# Patient Record
Sex: FEMALE | Race: WHITE | URBAN - METROPOLITAN AREA
[De-identification: names, ages, dates, MRNs, and addresses within clinical notes are randomized per-mention and may not be internally consistent; named-entity substitution may affect disease eponyms.]

---

## 2018-09-26 ENCOUNTER — OFFICE VISIT (OUTPATIENT)
Dept: URGENT CARE | Facility: URGENT CARE | Age: 58
End: 2018-09-26
Payer: COMMERCIAL

## 2018-09-26 VITALS
DIASTOLIC BLOOD PRESSURE: 80 MMHG | SYSTOLIC BLOOD PRESSURE: 128 MMHG | OXYGEN SATURATION: 95 % | TEMPERATURE: 98.1 F | RESPIRATION RATE: 18 BRPM | BODY MASS INDEX: 36.22 KG/M2 | HEART RATE: 90 BPM | WEIGHT: 221 LBS

## 2018-09-26 DIAGNOSIS — N23 RENAL COLIC: ICD-10-CM

## 2018-09-26 DIAGNOSIS — R30.0 DYSURIA: Primary | ICD-10-CM

## 2018-09-26 LAB
ALBUMIN UR-MCNC: NEGATIVE MG/DL
APPEARANCE UR: CLEAR
BACTERIA #/AREA URNS HPF: ABNORMAL /HPF
BILIRUB UR QL STRIP: NEGATIVE
COLOR UR AUTO: YELLOW
GLUCOSE UR STRIP-MCNC: NEGATIVE MG/DL
HGB UR QL STRIP: ABNORMAL
KETONES UR STRIP-MCNC: NEGATIVE MG/DL
LEUKOCYTE ESTERASE UR QL STRIP: ABNORMAL
MUCOUS THREADS #/AREA URNS LPF: PRESENT /LPF
NITRATE UR QL: NEGATIVE
NON-SQ EPI CELLS #/AREA URNS LPF: ABNORMAL /LPF
PH UR STRIP: 6 PH (ref 5–7)
RBC #/AREA URNS AUTO: ABNORMAL /HPF
SOURCE: ABNORMAL
SP GR UR STRIP: 1.01 (ref 1–1.03)
UROBILINOGEN UR STRIP-ACNC: 0.2 EU/DL (ref 0.2–1)
WBC #/AREA URNS AUTO: ABNORMAL /HPF

## 2018-09-26 PROCEDURE — 81001 URINALYSIS AUTO W/SCOPE: CPT | Performed by: NURSE PRACTITIONER

## 2018-09-26 PROCEDURE — 99213 OFFICE O/P EST LOW 20 MIN: CPT | Performed by: NURSE PRACTITIONER

## 2018-09-26 RX ORDER — SOTALOL HYDROCHLORIDE 80 MG/1
TABLET ORAL
COMMUNITY

## 2018-09-26 RX ORDER — SULFAMETHOXAZOLE/TRIMETHOPRIM 800-160 MG
1 TABLET ORAL 2 TIMES DAILY
Qty: 6 TABLET | Refills: 0 | Status: SHIPPED | OUTPATIENT
Start: 2018-09-26 | End: 2018-09-29

## 2018-09-26 ASSESSMENT — ENCOUNTER SYMPTOMS
FREQUENCY: 1
FEVER: 0
SORE THROAT: 0
HEADACHES: 0
SHORTNESS OF BREATH: 0
VOMITING: 0
HEMATURIA: 1
CHILLS: 0
RHINORRHEA: 0
COUGH: 0
NAUSEA: 0
DIARRHEA: 0

## 2018-09-26 ASSESSMENT — PAIN SCALES - GENERAL: PAINLEVEL: MODERATE PAIN (5)

## 2018-09-26 NOTE — MR AVS SNAPSHOT
"              After Visit Summary   9/26/2018    Leonora Quiros    MRN: [id number]           Patient Information     Date Of Birth          1960        Visit Information        Provider Department      9/26/2018 12:00 PM Linda Weldon NP [facility]        Today's Diagnoses     Dysuria    -  1    Renal colic          Care Instructions       * KIDNEY STONE (w/ Colic)    The sharp cramping pain and nausea/vomiting that you have is due to a small stone which has formed in the kidney and is now passing down a narrow tube (ureter) on its way to your bladder. Once it reaches your bladder, the pain will stop. The stone may pass in your urine stream in one piece. [The size may be 1/16\" to 1/4\" (1-6mm)]. Or, the stone may also break up into laney fragments which you may not even notice.  Once you have had a kidney stone there is a risk for recurrence in the future.  HOME CARE:      Drink lots of fluid (at least 8-10 glasses of water a day).    Most stones will pass on their own, but may take from a few hours to a few days.    Each time you urinate, do so in a jar. Pour the urine from the jar through the strainer and into the toilet. Continue doing this until 24 hours after your pain stops. By then, if there was a kidney stone, it should pass from your bladder. Some stones dissolve into sand-like particles and pass right through the strainer. In that case, you won't ever see a stone.    Save any stone that you find in the strainer and bring it to your doctor for analysis. It may be possible to prevent certain types of stones from forming. Therefore, it is important to know what kind of stone you have.    Try to stay as active as possible since this will help the stone pass. Do not stay in bed unless your pain prevents you from getting up. You may notice a red, pink or brown color to your urine. This is normal while passing a kidney stone.  FOLLOW UP with your doctor or return to this facility if the pain " lasts more than 48 hours.  GET PROMPT MEDICAL ATTENTION if any of the following occur:    Pain that is not controlled by the medicine given    Repeated vomiting or unable to keep down fluids    Weakness, dizziness or fainting    Fever over 101  F (38.3  C)    Passage of solid red or brown urine (can't see through it) or urine with lots of blood clots    Unable to pass urine for 8 hours and increasing bladder pressure    8972-7600 The [employer]. [address] [place] [address], [place] [address]. All rights reserved. This information is not intended as a substitute for professional medical care. Always follow your healthcare professional's instructions.  This information has been modified by your health care provider with permission from the publisher.            Follow-ups after your visit        Who to contact     If you have questions or need follow up information about today's clinic visit or your schedule please contact [facility] directly at [phone].  Normal or non-critical lab and imaging results will be communicated to you by MyChart, letter or phone within 4 business days after the clinic has received the results. If you do not hear from us within 7 days, please contact the clinic through MyChart or phone. If you have a critical or abnormal lab result, we will notify you by phone as soon as possible.  Submit refill requests through [employer] or call your pharmacy and they will forward the refill request to us. Please allow 3 business days for your refill to be completed.          Additional Information About Your Visit        Care EveryWhere ID     This is your Care EveryWhere ID. This could be used by other organizations to access your [facility] medical records  [id number]        Your Vitals Were     Pulse Temperature Respirations Pulse Oximetry BMI (Body Mass Index)       90 98.1  F (36.7  C) (Oral) 18 95% 36.22 kg/m2        Blood Pressure from Last 3 Encounters:   09/26/18  128/80   10/10/10 138/87   09/25/10 129/90    Weight from Last 3 Encounters:   09/26/18 221 lb (100.2 kg)   10/10/10 207 lb 12.8 oz (94.3 kg)   09/25/10 210 lb 3.2 oz (95.3 kg)              We Performed the Following     *UA reflex to Microscopic and Culture ([place] and [facility] (except [place] and [place])     Urine Microscopic          Today's Medication Changes          These changes are accurate as of 9/26/18 12:47 PM.  If you have any questions, ask your nurse or doctor.               Start taking these medicines.        Dose/Directions    acetaminophen-codeine 300-30 MG per tablet   Commonly known as:  TYLENOL #3   Used for:  Renal colic   Started by:  Linda Weldon NP        Dose:  1 tablet   Take 1 tablet by mouth every 6 hours as needed for moderate to severe pain   Quantity:  16 tablet   Refills:  0       sulfamethoxazole-trimethoprim 800-160 MG per tablet   Commonly known as:  BACTRIM DS/SEPTRA DS   Used for:  Dysuria   Started by:  Linda Weldon NP        Dose:  1 tablet   Take 1 tablet by mouth 2 times daily for 3 days   Quantity:  6 tablet   Refills:  0            Where to get your medicines      These medications were sent to [facility] [address] [place] [address] [place] [address] [place] AT [facility] [address]  [address] [place] [address]     Phone:  [phone]     sulfamethoxazole-trimethoprim 800-160 MG per tablet         Some of these will need a paper prescription and others can be bought over the counter.  Ask your nurse if you have questions.     Bring a paper prescription for each of these medications     acetaminophen-codeine 300-30 MG per tablet               Information about OPIOIDS     PRESCRIPTION OPIOIDS: WHAT YOU NEED TO KNOW   We gave you an opioid (narcotic) pain medicine. It is important to manage your pain, but opioids are not always the best choice. You should first try all the other options your care team gave you. Take this medicine for as short  a time (and as few doses) as possible.    Some activities can increase your pain, such as bandage changes or therapy sessions. It may help to take your pain medicine 30 to 60 minutes before these activities. Reduce your stress by getting enough sleep, working on hobbies you enjoy and practicing relaxation or meditation. Talk to your care team about ways to manage your pain beyond prescription opioids.    These medicines have risks:    DO NOT drive when on new or higher doses of pain medicine. These medicines can affect your alertness and reaction times, and you could be arrested for driving under the influence (DUI). If you need to use opioids long-term, talk to your care team about driving.    DO NOT operate heavy machinery    DO NOT do any other dangerous activities while taking these medicines.    DO NOT drink any alcohol while taking these medicines.     If the opioid prescribed includes acetaminophen, DO NOT take with any other medicines that contain acetaminophen. Read all labels carefully. Look for the word  acetaminophen  or  Tylenol.  Ask your pharmacist if you have questions or are unsure.    You can get addicted to pain medicines, especially if you have a history of addiction (chemical, alcohol or substance dependence). Talk to your care team about ways to reduce this risk.    All opioids tend to cause constipation. Drink plenty of water and eat foods that have a lot of fiber, such as fruits, vegetables, prune juice, apple juice and high-fiber cereal. Take a laxative (Miralax, milk of magnesia, Colace, Senna) if you don t move your bowels at least every other day. Other side effects include upset stomach, sleepiness, dizziness, throwing up, tolerance (needing more of the medicine to have the same effect), physical dependence and slowed breathing.    Store your pills in a secure place, locked if possible. We will not replace any lost or stolen medicine. If you don t finish your medicine, please throw away  (dispose) as directed by your pharmacist. The [place] Pollution Control Agency has more information about safe disposal: [email or web address].pca.[email or web address].[place].[email or web address]/living-green/managing-[email or web address]-medications         Primary Care Provider Fax #    Physician No Ref-Primary [phone]       No address on file        Equal Access to Services     OCHOAVERONICA AMADOR : Antonio mehdi ku hadasho Soomaali, waaxda luqadaha, qaybta kaalmada adealli, katalina tinoin hayaagaldino mcclelland maximino bárbara ceron. So [facility] [phone].    ATENCIÓN: Si habla español, tiene a reyes disposición servicios gratuitos de asistencia lingüística. Andrew al [phone].    We comply with applicable federal civil rights laws and [place] laws. We do not discriminate on the basis of race, color, national origin, age, disability, sex, sexual orientation, or gender identity.            Thank you!     Thank you for choosing [facility]  for your care. Our goal is always to provide you with excellent care. Hearing back from our patients is one way we can continue to improve our services. Please take a few minutes to complete the written survey that you may receive in the mail after your visit with us. Thank you!             Your Updated Medication List - Protect others around you: Learn how to safely use, store and throw away your medicines at www.disposemymeds.org.          This list is accurate as of 9/26/18 12:47 PM.  Always use your most recent med list.                   Brand Name Dispense Instructions for use Diagnosis    acetaminophen-codeine 300-30 MG per tablet    TYLENOL #3    16 tablet    Take 1 tablet by mouth every 6 hours as needed for moderate to severe pain    Renal colic       alendronate 70 MG tablet    FOSAMAX     take 70 mg by mouth every 7 days.    Skin tag       * aspirin 325 MG EC tablet      take 325 mg by mouth daily. (*)    Skin tag       * ASPIRIN 81 PO           calcium 600 MG tablet      take 1 tablet by mouth 2 times daily.    Skin tag        levothyroxine 75 MCG tablet    SYNTHROID/LEVOTHROID     Take 100 mcg by mouth daily    Skin tag       LIPITOR 40 MG tablet   Generic drug:  atorvastatin      take 40 mg by mouth daily.    Skin tag       lisinopril 5 MG tablet    PRINIVIL/ZESTRIL     take 5 mg by mouth 2 times daily.    Skin tag       metoprolol succinate 50 MG 24 hr tablet    TOPROL-XL     Take 100 mg by mouth daily    Skin tag       MULTI-VITAMIN PO      take  by mouth.    Skin tag       rivaroxaban ANTICOAGULANT 20 MG Tabs tablet    XARELTO     Take 20 mg by mouth daily (with dinner)        sotalol HCl (AF) 80 MG Tabs           sulfamethoxazole-trimethoprim 800-160 MG per tablet    BACTRIM DS/SEPTRA DS    6 tablet    Take 1 tablet by mouth 2 times daily for 3 days    Dysuria       * Notice:  This list has 2 medication(s) that are the same as other medications prescribed for you. Read the directions carefully, and ask your doctor or other care provider to review them with you.

## 2018-09-26 NOTE — PATIENT INSTRUCTIONS
"   * KIDNEY STONE (w/ Colic)    The sharp cramping pain and nausea/vomiting that you have is due to a small stone which has formed in the kidney and is now passing down a narrow tube (ureter) on its way to your bladder. Once it reaches your bladder, the pain will stop. The stone may pass in your urine stream in one piece. [The size may be 1/16\" to 1/4\" (1-6mm)]. Or, the stone may also break up into laney fragments which you may not even notice.  Once you have had a kidney stone there is a risk for recurrence in the future.  HOME CARE:      Drink lots of fluid (at least 8-10 glasses of water a day).    Most stones will pass on their own, but may take from a few hours to a few days.    Each time you urinate, do so in a jar. Pour the urine from the jar through the strainer and into the toilet. Continue doing this until 24 hours after your pain stops. By then, if there was a kidney stone, it should pass from your bladder. Some stones dissolve into sand-like particles and pass right through the strainer. In that case, you won't ever see a stone.    Save any stone that you find in the strainer and bring it to your doctor for analysis. It may be possible to prevent certain types of stones from forming. Therefore, it is important to know what kind of stone you have.    Try to stay as active as possible since this will help the stone pass. Do not stay in bed unless your pain prevents you from getting up. You may notice a red, pink or brown color to your urine. This is normal while passing a kidney stone.  FOLLOW UP with your doctor or return to this facility if the pain lasts more than 48 hours.  GET PROMPT MEDICAL ATTENTION if any of the following occur:    Pain that is not controlled by the medicine given    Repeated vomiting or unable to keep down fluids    Weakness, dizziness or fainting    Fever over 101  F (38.3  C)    Passage of solid red or brown urine (can't see through it) or urine with lots of blood " clots    Unable to pass urine for 8 hours and increasing bladder pressure    8528-0627 [employer]. [address] [place] [address], [place] [address]. All rights reserved. This information is not intended as a substitute for professional medical care. Always follow your healthcare professional's instructions.  This information has been modified by your health care provider with permission from the publisher.

## 2018-09-26 NOTE — PROGRESS NOTES
SUBJECTIVE:   Leonora Quiros is a 58 year old female presenting with a chief complaint of   Chief Complaint   Patient presents with     UTI     x1 week       She is an established patient of [facility].    UTI    Onset of symptoms was 1week(s).  Course of illness is worsening  Severity moderate  Current and associated symptoms frequency, blood in urine and suprapubic pain and pressure  Treatment and measures tried None  Predisposing factors include history of Pyelonephritis  Patient denies rigors, flank pain, temperature > 101 degrees F., vomiting, vaginal discharge, vaginal odor and vaginal itching          Review of Systems   Constitutional: Negative for chills and fever.   HENT: Negative for congestion, ear pain, rhinorrhea and sore throat.    Respiratory: Negative for cough and shortness of breath.    Gastrointestinal: Negative for diarrhea, nausea and vomiting.   Genitourinary: Positive for frequency and hematuria.        Suprapubic pain and pressure   Neurological: Negative for headaches.   All other systems reviewed and are negative.      Past Medical History:   Diagnosis Date     Hypertension      Family History   Problem Relation Age of Onset     Cancer Mother      HEART DISEASE Mother      Hypertension Mother      Cerebrovascular Disease Mother      HEART DISEASE Father      Diabetes No family hx of      Thyroid Disease No family hx of      Glaucoma No family hx of      Macular Degeneration No family hx of      Current Outpatient Prescriptions   Medication Sig Dispense Refill     acetaminophen-codeine (TYLENOL #3) 300-30 MG per tablet Take 1 tablet by mouth every 6 hours as needed for moderate to severe pain 16 tablet 0     ASPIRIN 81 PO        atorvastatin (LIPITOR) 40 MG tablet take 40 mg by mouth daily.       Calcium 600 MG tablet take 1 tablet by mouth 2 times daily.       levothyroxine (SYNTHROID, LEVOTHROID) 75 MCG tablet Take 100 mcg by mouth daily        lisinopril (PRINIVIL,ZESTRIL) 5 MG tablet take  5 mg by mouth 2 times daily.       metoprolol (TOPROL-XL) 50 MG 24 hr tablet Take 100 mg by mouth daily        Multiple Vitamin (MULTI-VITAMIN PO) take  by mouth.       rivaroxaban ANTICOAGULANT (XARELTO) 20 MG TABS tablet Take 20 mg by mouth daily (with dinner)       sotalol HCl, AF, 80 MG TABS        sulfamethoxazole-trimethoprim (BACTRIM DS/SEPTRA DS) 800-160 MG per tablet Take 1 tablet by mouth 2 times daily for 3 days 6 tablet 0     ALENdronate (FOSAMAX) 70 MG tablet take 70 mg by mouth every 7 days.       aspirin 325 MG EC tablet take 325 mg by mouth daily. (*)       Social History   Substance Use Topics     Smoking status: Former Smoker     Quit date: 3/19/2007     Smokeless tobacco: Never Used     Alcohol use No       OBJECTIVE  /80 (BP Location: Left arm, Patient Position: Chair, Cuff Size: Adult Large)  Pulse 90  Temp 98.1  F (36.7  C) (Oral)  Resp 18  Wt 221 lb (100.2 kg)  SpO2 95%  BMI 36.22 kg/m2    Physical Exam   Cardiovascular: Normal rate and normal heart sounds.    Pulmonary/Chest: Effort normal and breath sounds normal.   Abdominal:    ABD: soft, no tenderness to palpation , no rigidity, guarding or rebound . No CVAT           Neurological: She is alert.   Psychiatric: She has a normal mood and affect. Her behavior is normal. Judgment and thought content normal.       Labs:  Results for orders placed or performed in visit on 09/26/18 (from the past 24 hour(s))   *UA reflex to Microscopic and Culture ([place] and [facility] (except [place] and [employer])   Result Value Ref Range    Color Urine Yellow     Appearance Urine Clear     Glucose Urine Negative NEG^Negative mg/dL    Bilirubin Urine Negative NEG^Negative    Ketones Urine Negative NEG^Negative mg/dL    Specific Gravity Urine 1.010 1.003 - 1.035    Blood Urine Large (A) NEG^Negative    pH Urine 6.0 5.0 - 7.0 pH    Protein Albumin Urine Negative NEG^Negative mg/dL    Urobilinogen Urine 0.2 0.2 - 1.0 EU/dL    Nitrite Urine  Negative NEG^Negative    Leukocyte Esterase Urine Small (A) NEG^Negative    Source Midstream Urine    Urine Microscopic   Result Value Ref Range    WBC Urine 0 - 5 OTO5^0 - 5 /HPF    RBC Urine 10-25 (A) OTO2^O - 2 /HPF    Squamous Epithelial /LPF Urine Few FEW^Few /LPF    Bacteria Urine Few (A) NEG^Negative /HPF    Mucous Urine Present (A) NEG^Negative /LPF     ASSESSMENT:      ICD-10-CM    1. Dysuria R30.0 *UA reflex to Microscopic and Culture ([place] and [facility] (except [place] and [place])     Urine Microscopic     sulfamethoxazole-trimethoprim (BACTRIM DS/SEPTRA DS) 800-160 MG per tablet   2. Renal colic N23 acetaminophen-codeine (TYLENOL #3) 300-30 MG per tablet        Differential Diagnosis:  UTI: Pyelonephritis    Serious Comorbid Conditions:  Adult:  None    PLAN:  History of Kidney stone a month ago 8/26/2018, treated at [facility]. Kidney stone found on left ureters.    Patient will call his PCP or urology for follow up.

## 2019-04-25 ENCOUNTER — OFFICE VISIT (OUTPATIENT)
Dept: OPTOMETRY | Facility: CLINIC | Age: 59
End: 2019-04-25
Payer: COMMERCIAL

## 2019-04-25 DIAGNOSIS — E05.00 GRAVES' DISEASE WITH EXOPHTHALMOS: ICD-10-CM

## 2019-04-25 DIAGNOSIS — S05.01XA ABRASION OF RIGHT CORNEA, INITIAL ENCOUNTER: Primary | ICD-10-CM

## 2019-04-25 DIAGNOSIS — S05.02XA ABRASION OF LEFT CONJUNCTIVA, INITIAL ENCOUNTER: ICD-10-CM

## 2019-04-25 PROCEDURE — 92002 INTRM OPH EXAM NEW PATIENT: CPT | Performed by: OPTOMETRIST

## 2019-04-25 RX ORDER — MOXIFLOXACIN 5 MG/ML
1 SOLUTION/ DROPS OPHTHALMIC 4 TIMES DAILY
Qty: 5 ML | Refills: 0 | Status: SHIPPED | OUTPATIENT
Start: 2019-04-25 | End: 2019-04-29

## 2019-04-25 ASSESSMENT — VISUAL ACUITY
OD_CC: 20/40
METHOD: SNELLEN - LINEAR
OD_CC+: -2
CORRECTION_TYPE: GLASSES
OS_CC: 20/20

## 2019-04-25 ASSESSMENT — EXTERNAL EXAM - RIGHT EYE: OD_EXAM: EXOPHTHALMOS

## 2019-04-25 ASSESSMENT — REFRACTION_WEARINGRX
OD_AXIS: 037
OD_ADD: +2.00
OS_CYLINDER: +0.75
OD_SPHERE: +0.25
OS_SPHERE: +0.25
OD_CYLINDER: +1.50
OS_AXIS: 006
OS_ADD: +2.00

## 2019-04-25 ASSESSMENT — EXTERNAL EXAM - LEFT EYE: OS_EXAM: NORMAL

## 2019-04-25 ASSESSMENT — SLIT LAMP EXAM - LIDS
COMMENTS: LID RETRACTION: UPPER LID
COMMENTS: NORMAL

## 2019-04-25 NOTE — PROGRESS NOTES
Chief Complaint   Patient presents with     Eye Pain     In right eye. Characterized as foreign body sensation and irritation. Pain was noted as 3/10. Occurring constantly. Duration of 5 days. Associated symptoms include redness, itching, blurred vision, and foreign body sensation. Treatments tried include eye drops. Response to treatment was no improvement.   Comments      Patient was rubbing eye when woke up and eye lid flipped up. Patient thinks might have scratched eye when tried to lower lid. This happened 3 years ago to the same eye.    for 2 days eye is burtonopemaureen    JORGE A- 2013    Saw specialist at the  in 2017 for decompression surgery right eye- but decided against it.LIANNA Avina, Optometric Assistant, A.B.O.C.     Medical, surgical and family histories reviewed and updated 4/25/2019.       OBJECTIVE: See Ophthalmology exam    ASSESSMENT:    ICD-10-CM    1. Abrasion of right cornea, initial encounter S05.01XA moxifloxacin (VIGAMOX) 0.5 % ophthalmic solution   2. Abrasion of left conjunctiva, initial encounter S05.02XA moxifloxacin (VIGAMOX) 0.5 % ophthalmic solution   3. Graves' disease with exophthalmos E05.00 OPHTHALMOLOGY ADULT REFERRAL      PLAN:     Patient Instructions   Vigamox- 1 drop right eye 4 x day for 4 days.    REturn if signs/symptoms persist or worsen.    Recommend annual eye exams.    Referral to Dr. Vicky James at the [facility] for evaluation of exophthalmos- [phone].    Jose Carlos Morgan, OD

## 2019-04-25 NOTE — LETTER
4/25/2019         RE: Leonora Quiros  [address] [place] [address]        Dear Colleague,    Thank you for referring your patient, Leonora Quiros, to the [facility]. Please see a copy of my visit note below.    Chief Complaint   Patient presents with     Eye Pain     In right eye. Characterized as foreign body sensation and irritation. Pain was noted as 3/10. Occurring constantly. Duration of 5 days. Associated symptoms include redness, itching, blurred vision, and foreign body sensation. Treatments tried include eye drops. Response to treatment was no improvement.   Comments      Patient was rubbing eye when woke up and eye lid flipped up. Patient thinks might have scratched eye when tried to lower lid. This happened 3 years ago to the same eye.    for 2 days eye is burtonopemaureen    JORGE A- 2013    Saw specialist at the  in 2017 for decompression surgery right eye- but decided against it.LIANNA Avina, Optometric Assistant, A.B.O.C.     Medical, surgical and family histories reviewed and updated 4/25/2019.       OBJECTIVE: See Ophthalmology exam    ASSESSMENT:    ICD-10-CM    1. Abrasion of right cornea, initial encounter S05.01XA moxifloxacin (VIGAMOX) 0.5 % ophthalmic solution   2. Abrasion of left conjunctiva, initial encounter S05.02XA moxifloxacin (VIGAMOX) 0.5 % ophthalmic solution   3. Graves' disease with exophthalmos E05.00 OPHTHALMOLOGY ADULT REFERRAL      PLAN:     Patient Instructions   Vigamox- 1 drop right eye 4 x day for 4 days.    REturn if signs/symptoms persist or worsen.    Recommend annual eye exams.    Referral to Dr. Vicky James at the [facility] for evaluation of exophthalmos- [phone].    Jose Carlos Morgan, GERBER             Again, thank you for allowing me to participate in the care of your patient.        Sincerely,        Jose Carlos Morgan, OD

## 2019-04-25 NOTE — PATIENT INSTRUCTIONS
Vigamox- 1 drop right eye 4 x day for 4 days.    REturn if signs/symptoms persist or worsen.    Recommend annual eye exams.    Referral to Dr. Vicky James at the [facility] for evaluation of exophthalmos- [phone].    Jose Carlos Morgan, OD